# Patient Record
Sex: MALE | Race: WHITE
[De-identification: names, ages, dates, MRNs, and addresses within clinical notes are randomized per-mention and may not be internally consistent; named-entity substitution may affect disease eponyms.]

---

## 2019-03-11 NOTE — DIAGNOSTIC IMAGING REPORT
Indication: Lower back pain

 

Technique: 4 views of the lumbar spine

 

Comparison: None

 

Findings: There is a wedge compression fracture deformity of the L1 vertebral body.

This is also an approximately 40% loss of height anteriorly. Acuity of this is

indeterminate, although it is not visible on a CT of the chest dated 4/5/2012. The

remaining vertebral body heights are preserved. The bony alignment is normal. There

is severe degenerative disc narrowing of the L5-S1 disc. The remaining disc spaces

are preserved. No other acute fractures. No dislocations. The pedicles are intact.

Sacral arches are preserved. Sacroiliac joint spaces are preserved. 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

hip prosthesis. There is mild degenerative arthrosis of the bilateral L4-5 and L5-S1

facets.

 

Impression: L1 wedge compression fracture deformity. Age is indeterminate. Consider

MRI for better characterization of age if clinically indicated and if patient is a

candidate for intervention

 

Degenerative changes, as described

 

Other findings as noted

## 2019-04-16 ENCOUNTER — HOSPITAL ENCOUNTER (EMERGENCY)
Dept: HOSPITAL 72 - EMR | Age: 79
Discharge: ANOTHER HOSPITAL | End: 2019-04-16
Payer: MEDICARE

## 2019-04-16 VITALS — SYSTOLIC BLOOD PRESSURE: 115 MMHG | DIASTOLIC BLOOD PRESSURE: 68 MMHG

## 2019-04-16 VITALS — BODY MASS INDEX: 28 KG/M2 | HEIGHT: 71 IN | WEIGHT: 200 LBS

## 2019-04-16 VITALS — DIASTOLIC BLOOD PRESSURE: 80 MMHG | SYSTOLIC BLOOD PRESSURE: 125 MMHG

## 2019-04-16 VITALS — DIASTOLIC BLOOD PRESSURE: 68 MMHG | SYSTOLIC BLOOD PRESSURE: 143 MMHG

## 2019-04-16 VITALS — SYSTOLIC BLOOD PRESSURE: 143 MMHG | DIASTOLIC BLOOD PRESSURE: 68 MMHG
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
## 2019-04-16 DIAGNOSIS — J32.0: ICD-10-CM

## 2019-04-16 DIAGNOSIS — J32.2: ICD-10-CM

## 2019-04-16 DIAGNOSIS — E03.9: ICD-10-CM

## 2019-04-16 DIAGNOSIS — Z91.048: ICD-10-CM

## 2019-04-16 DIAGNOSIS — I10: ICD-10-CM

## 2019-04-16 DIAGNOSIS — S09.90XA: ICD-10-CM

## 2019-04-16 DIAGNOSIS — R55: Primary | ICD-10-CM
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
## 2019-04-16 DIAGNOSIS — Z86.73: ICD-10-CM

## 2019-04-16 LAB
ADD MANUAL DIFF: NO
ALBUMIN SERPL-MCNC: 3.6 G/DL (ref 3.4–5)
ALBUMIN/GLOB SERPL: 0.9 {RATIO} (ref 1–2.7)
ALP SERPL-CCNC: 138 U/L (ref 46–116)
ALT SERPL-CCNC: 71 U/L (ref 12–78)
ANION GAP SERPL CALC-SCNC: 10 MMOL/L (ref 5–15)
APTT BLD: 30 SEC (ref 23–33)
AST SERPL-CCNC: 54 U/L (ref 15–37)
BASOPHILS NFR BLD AUTO: 0.8 % (ref 0–2)
BILIRUB SERPL-MCNC: 0.6 MG/DL (ref 0.2–1)
BUN SERPL-MCNC: 28 MG/DL (ref 7–18)
CALCIUM SERPL-MCNC: 9.4 MG/DL (ref 8.5–10.1)
CHLORIDE SERPL-SCNC: 105 MMOL/L (ref 98–107)
CK MB SERPL-MCNC: 13.4 NG/ML (ref 0–3.6)
CK SERPL-CCNC: 571 U/L (ref 26–308)
CO2 SERPL-SCNC: 25 MMOL/L (ref 21–32)
CREAT SERPL-MCNC: 1.4 MG/DL (ref 0.55–1.3)
EOSINOPHIL NFR BLD AUTO: 4.1 % (ref 0–3)
ERYTHROCYTE [DISTWIDTH] IN BLOOD BY AUTOMATED COUNT: 12.5 % (ref 11.6–14.8)
GLOBULIN SER-MCNC: 4.2 G/DL
HCT VFR BLD CALC: 46.7 % (ref 42–52)
HGB BLD-MCNC: 16.2 G/DL (ref 14.2–18)
INR PPP: 1 (ref 0.9–1.1)
LYMPHOCYTES NFR BLD AUTO: 31.5 % (ref 20–45)
MCV RBC AUTO: 90 FL (ref 80–99)
MONOCYTES NFR BLD AUTO: 11.4 % (ref 1–10)
NEUTROPHILS NFR BLD AUTO: 52.2 % (ref 45–75)
PLATELET # BLD: 191 K/UL (ref 150–450)
POTASSIUM SERPL-SCNC: 4.1 MMOL/L (ref 3.5–5.1)
RBC # BLD AUTO: 5.2 M/UL (ref 4.7–6.1)
SODIUM SERPL-SCNC: 140 MMOL/L (ref 136–145)
WBC # BLD AUTO: 6.8 K/UL (ref 4.8–10.8)

## 2019-04-16 PROCEDURE — 85025 COMPLETE CBC W/AUTO DIFF WBC: CPT

## 2019-04-16 PROCEDURE — 82553 CREATINE MB FRACTION: CPT

## 2019-04-16 PROCEDURE — 70450 CT HEAD/BRAIN W/O DYE: CPT

## 2019-04-16 PROCEDURE — 82550 ASSAY OF CK (CPK): CPT

## 2019-04-16 PROCEDURE — 80053 COMPREHEN METABOLIC PANEL: CPT

## 2019-04-16 PROCEDURE — 93005 ELECTROCARDIOGRAM TRACING: CPT

## 2019-04-16 PROCEDURE — 84484 ASSAY OF TROPONIN QUANT: CPT

## 2019-04-16 PROCEDURE — 85610 PROTHROMBIN TIME: CPT

## 2019-04-16 PROCEDURE — 36415 COLL VENOUS BLD VENIPUNCTURE: CPT

## 2019-04-16 PROCEDURE — 99284 EMERGENCY DEPT VISIT MOD MDM: CPT

## 2019-04-16 PROCEDURE — 85730 THROMBOPLASTIN TIME PARTIAL: CPT

## 2019-04-16 NOTE — NUR
ED Nurse Note:



Pt from home came in due to an brasion on his head. Pt stated that he bumped 
his head into a bed frame on his garage. Denies LOC. pt is AAO x4, ambulatory 
with unlabored breathing.

## 2019-04-16 NOTE — NUR
ED Nurse Note:



Dr Carmona at the bed side informing of pt regarding his admission. Noted 
bilateral swelling of feet.

## 2019-04-16 NOTE — NUR
ED Nurse Note:

Report given to RENEE Lala of [facility]. Patient to be admitted to [facility] 
under the care of MD Ilan. Report given to [facility].

## 2019-04-16 NOTE — EMERGENCY ROOM REPORT
History of Present Illness


General


Chief Complaint:  Laceration


Source:  Patient





Present Illness


HPI


Patient presents with complaints of trauma to the top parietal region of the 

scalp





Patient reports that this was fairly mechanical injury hitting the head on a 

cabinet door





Denies any obvious lapse of consciousness





Denies any chest pain or shortness of breath


Denies any neck pain


Denies any focal weakness patient reports that he had injured his low back 

recently and has been less mobile


Allergies:  


Coded Allergies:  


     No Known Allergies (Verified , 4/5/12)


Uncoded Allergies:  


     POLLEN (Allergy, Unknown, 4/5/12)


     RAGWEED (Allergy, Unknown, 4/5/12)





Patient History


Past Medical History:  see triage record


Pertinent Family History:  none


Reviewed Nursing Documentation:  PMH: Agreed; PSxH: Agreed





Nursing Documentation-PMH


Hx Cardiac Problems:  Yes


Hx Hypertension:  Yes


Hx Diabetes:  No - HYPOTHYROIDISM


Hx Cancer:  No


Hx Gastrointestinal Problems:  No


Hx Neurological Problems:  Yes


Hx Transient Ischemic Attacks:  Yes - over a week ago


Hx Numbness:  Yes


Hx Weakness:  Yes





Review of Systems


All Other Systems:  negative except mentioned in HPI





Physical Exam





Vital Signs








  Date Time  Temp Pulse Resp B/P (MAP) Pulse Ox O2 Delivery O2 Flow Rate FiO2


 


4/16/19 15:26 98.6 95 16 103/65 92 Room Air  








Sp02 EP Interpretation:  reviewed, normal


General Appearance:  well appearing, no apparent distress


Head:  normocephalic, other - Healed scab formation approximately 1cm top 

parietal region


Eyes:  bilateral eye PERRL, bilateral eye EOMI


ENT:  hearing grossly normal, normal pharynx, TMs + canals normal, uvula midline


Neck:  full range of motion, supple, no meningismus, no bony tend


Respiratory:  lungs clear, normal breath sounds, no rhonchi, no respiratory 

distress, no retraction, no accessory muscle use


Cardiovascular #1:  normal peripheral pulses, regular rate, rhythm, no gallop, 

no JVD, no murmur


Gastrointestinal:  normal bowel sounds, non tender, soft, no mass, no 

organomegaly, non-distended, no guarding, no hernia, no pulsatile mass, no 

rebound


Genitourinary:  no CVA tenderness


Musculoskeletal:  other - Ambulatory no obvious focal weakness


Neurologic:  oriented x3, responsive, CNs III-XII nml as tested, motor strength/

tone normal, sensory intact


Psychiatric:  mood/affect normal


Skin:  normal color, other - Edema bilaterally


Lymphatic:  normal inspection, no adenopathy





Medical Decision Making


Diagnostic Impression:  


 Primary Impression:  


 Syncope


ER Course


Patient is a fairly complex patient with multiple differential to consideration 

including but not limited to cardiac cardiopulmonary and vascular emergencies





Patient CT head does not show any acute disease


Blood work reveals elevated total CK


Also CK-MB was elevated


Patient's kidney function also shows some elevation





With the patient's edema fall there is question of possible syncopal episode 

and patient requires admission


Secondary to insurance purposes patient is requested for transfer





Dr. Gagnon who had contacted us initially was also notified





Labs








Test


  4/16/19


15:45


 


White Blood Count


  6.8 K/UL


(4.8-10.8)


 


Red Blood Count


  5.20 M/UL


(4.70-6.10)


 


Hemoglobin


  16.2 G/DL


(14.2-18.0)


 


Hematocrit


  46.7 %


(42.0-52.0)


 


Mean Corpuscular Volume 90 FL (80-99) 


 


Mean Corpuscular Hemoglobin


  31.1 PG


(27.0-31.0)


 


Mean Corpuscular Hemoglobin


Concent 34.7 G/DL


(32.0-36.0)


 


Red Cell Distribution Width


  12.5 %


(11.6-14.8)


 


Platelet Count


  191 K/UL


(150-450)


 


Mean Platelet Volume


  6.1 FL


(6.5-10.1)


 


Neutrophils (%) (Auto)


  52.2 %


(45.0-75.0)


 


Lymphocytes (%) (Auto)


  31.5 %


(20.0-45.0)


 


Monocytes (%) (Auto)


  11.4 %


(1.0-10.0)


 


Eosinophils (%) (Auto)


  4.1 %


(0.0-3.0)


 


Basophils (%) (Auto)


  0.8 %


(0.0-2.0)


 


Prothrombin Time


  10.5 SEC


(9.30-11.50)


 


Prothromb Time International


Ratio 1.0 (0.9-1.1) 


 


 


Activated Partial


Thromboplast Time 30 SEC (23-33) 


 


 


Sodium Level


  140 MMOL/L


(136-145)


 


Potassium Level


  4.1 MMOL/L


(3.5-5.1)


 


Chloride Level


  105 MMOL/L


()


 


Carbon Dioxide Level


  25 MMOL/L


(21-32)


 


Anion Gap


  10 mmol/L


(5-15)


 


Blood Urea Nitrogen


  28 mg/dL


(7-18)


 


Creatinine


  1.4 MG/DL


(0.55-1.30)


 


Estimat Glomerular Filtration


Rate  mL/min (>60) 


 


 


Glucose Level


  85 MG/DL


()


 


Calcium Level


  9.4 MG/DL


(8.5-10.1)


 


Total Bilirubin


  0.6 MG/DL


(0.2-1.0)


 


Aspartate Amino Transf


(AST/SGOT) 54 U/L (15-37) 


 


 


Alanine Aminotransferase


(ALT/SGPT) 71 U/L (12-78) 


 


 


Alkaline Phosphatase


  138 U/L


()


 


Total Creatine Kinase


  571 U/L


()


 


Creatine Kinase MB


  13.4 NG/ML


(0.0-3.6)


 


Creatine Kinase MB Relative


Index 2.3 


 


 


Troponin I


  0.014 ng/mL


(0.000-0.056)


 


Total Protein


  7.8 G/DL


(6.4-8.2)


 


Albumin


  3.6 G/DL


(3.4-5.0)


 


Globulin 4.2 g/dL 


 


Albumin/Globulin Ratio 0.9 (1.0-2.7) 








EKG Diagnostic Results


Rate:  normal


Rhythm:  NSR


ST Segments:  no acute changes





Rhythm Strip Diag. Results


EP Interpretation:  yes


Rate:  66


Rhythm:  NSR, no PVC's, no ectopy





CT/MRI/US Diagnostic Results


CT/MRI/US Diagnostic Results :  


   Impression


CT head no acute disease





Last Vital Signs








  Date Time  Temp Pulse Resp B/P (MAP) Pulse Ox O2 Delivery O2 Flow Rate FiO2


 


4/16/19 15:26 98.6 95 16 103/65 92 Room Air  








Status:  improved


[facility]


Condition:  Serious


Referrals:  


CRISTAL MCINTYRE (PCP)











Peyton Carmona DO Apr 16, 2019 17:02

## 2019-04-16 NOTE — DIAGNOSTIC IMAGING REPORT
Indications: Head trauma, abrasion on head

 

Technique: Spiral acquisitions obtained through the brain. Angled axial and coronal 5

x 5 mm slices were reconstructed. Total dose length product 1456.29 mGycm.  CTDI

vol(s) 70.38 mGy. Dose reduction achieved using automated exposure control

 

Comparison: None.

 

Findings: No acute intercranial hemorrhage nor edema, mass effect, nor midline shift.

Normal gray-white differentiation. Normal-sized ventricles and extra axial CSF

spaces. There is minimal periventricular deep white matter low-attenuation,

consistent with chronic deep white matter ischemic change. There is left maxillary

and ethmoid sinus disease and sphenoid sinus disease. The visualized orbits are

unremarkable. The mastoids are clear. The calvarium is intact

 

Impression: . No acute intracranial bleed or mass effect

 

Incidental finding of sinus disease, as described

 

 

 

 

 

The CT scanner at [facility] is accredited by the American College of

Radiology and the scans are performed using protocols designed to limit radiation

exposure to as low as reasonably achievable to attain images of sufficient resolution

adequate for diagnostic evaluation.